# Patient Record
Sex: FEMALE | Race: BLACK OR AFRICAN AMERICAN
[De-identification: names, ages, dates, MRNs, and addresses within clinical notes are randomized per-mention and may not be internally consistent; named-entity substitution may affect disease eponyms.]

---

## 2019-08-31 ENCOUNTER — HOSPITAL ENCOUNTER (EMERGENCY)
Age: 24
Discharge: HOME | End: 2019-08-31
Payer: OTHER GOVERNMENT

## 2019-08-31 DIAGNOSIS — S61.211A: Primary | ICD-10-CM
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
## 2019-08-31 DIAGNOSIS — J45.909: ICD-10-CM
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
## 2019-08-31 PROCEDURE — 90715 TDAP VACCINE 7 YRS/> IM: CPT

## 2019-08-31 PROCEDURE — 12001 RPR S/N/AX/GEN/TRNK 2.5CM/<: CPT

## 2019-08-31 PROCEDURE — 99283 EMERGENCY DEPT VISIT LOW MDM: CPT

## 2019-08-31 PROCEDURE — 90471 IMMUNIZATION ADMIN: CPT

## 2020-07-17 ENCOUNTER — OFFICE (OUTPATIENT)
Dept: URBAN - METROPOLITAN AREA CLINIC 67 | Facility: CLINIC | Age: 25
End: 2020-07-17

## 2020-07-17 VITALS — SYSTOLIC BLOOD PRESSURE: 115 MMHG | DIASTOLIC BLOOD PRESSURE: 69 MMHG | WEIGHT: 124 LBS | HEIGHT: 65 IN

## 2020-07-17 DIAGNOSIS — K50.114 CROHN'S DISEASE OF LARGE INTESTINE W/ ABSCESS: ICD-10-CM

## 2020-07-17 PROCEDURE — 99203 OFFICE O/P NEW LOW 30 MIN: CPT | Performed by: INTERNAL MEDICINE

## 2020-07-17 RX ORDER — PREDNISONE 10 MG/1
TABLET ORAL
Qty: 100 | Status: ACTIVE
Start: 2020-07-17

## 2020-07-17 NOTE — SERVICEHPINOTES
This is a   24   year old  female   seen   in consultation at the request of Dr. JYOTI DENNIS  . Dianosed with UC for yers with last colo 2018 showing severe colitis in [place]. History of steroid tx, Remicade and Humira but admits that she stops the meds b/c she "hates taking meds". 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 She doesn't believe in meds and treats her colitis with diet.

## 2020-07-20 ENCOUNTER — AMBULATORY SURGICAL CENTER (OUTPATIENT)
Dept: URBAN - METROPOLITAN AREA SURGERY 42 | Facility: SURGERY | Age: 25
End: 2020-07-20

## 2020-07-20 VITALS
HEART RATE: 77 BPM | OXYGEN SATURATION: 99 % | DIASTOLIC BLOOD PRESSURE: 59 MMHG | TEMPERATURE: 98.4 F | SYSTOLIC BLOOD PRESSURE: 108 MMHG | HEIGHT: 65 IN | WEIGHT: 124 LBS | RESPIRATION RATE: 12 BRPM

## 2020-07-20 DIAGNOSIS — K50.114 CROHN'S DISEASE OF LARGE INTESTINE WITH ABSCESS: ICD-10-CM

## 2020-07-20 DIAGNOSIS — K63.89 OTHER SPECIFIED DISEASES OF INTESTINE: ICD-10-CM

## 2020-07-20 PROCEDURE — 45380 COLONOSCOPY AND BIOPSY: CPT | Performed by: INTERNAL MEDICINE

## 2020-09-23 ENCOUNTER — OFFICE (OUTPATIENT)
Dept: URBAN - METROPOLITAN AREA CLINIC 67 | Facility: CLINIC | Age: 25
End: 2020-09-23

## 2020-09-23 VITALS — HEIGHT: 65 IN

## 2020-09-23 DIAGNOSIS — K50.114 CROHN'S DISEASE OF LARGE INTESTINE W/ ABSCESS: ICD-10-CM

## 2020-09-23 PROCEDURE — 99213 OFFICE O/P EST LOW 20 MIN: CPT | Performed by: INTERNAL MEDICINE

## 2020-09-23 PROCEDURE — G0406 INPT/TELE FOLLOW UP 15: HCPCS | Performed by: INTERNAL MEDICINE

## 2020-09-23 NOTE — SERVICEHPINOTES
The patient complains of diarrhea.    Diarrheal symptoms started    ago.   Bowel movements have been occurring   1 - 3   times per day.    At the onset, diarrhea started   .   Stools have been occurring   at random times of the day  .   The stool is described as   soft   in consistency.   The patient    seen blood in the stool.   The patient has also noted   .   Possible exposures/etiologies include   .    Suspected exacerbating factors include   .   Diarrhea is alleviated so far by   .   The patient has already tried taking   .   Has noted    relief.   Has had    performed thus far for evaluation.   A prior colonoscopy was performed    years ago.

## 2021-06-10 ENCOUNTER — APPOINTMENT (OUTPATIENT)
Dept: FAMILY MEDICINE | Facility: CLINIC | Age: 26
End: 2021-06-10
Payer: COMMERCIAL

## 2021-06-10 DIAGNOSIS — K62.5 HEMORRHAGE OF ANUS AND RECTUM: ICD-10-CM

## 2021-06-10 PROCEDURE — 99423 OL DIG E/M SVC 21+ MIN: CPT

## 2021-06-10 NOTE — ASSESSMENT
[FreeTextEntry1] : I have along discussion with the patient trying to convince her that need to be evaluated ASAP to local ER to have blood work to asses the severity of anemia\par [place] in [place] and at that time was recommended to see Dr Yoder who saw her several years ago and will refer her to see a colo-rectal surgeon to address perianal fistula\par Reinforced the need of immediate ER Evaluation\par

## 2021-06-10 NOTE — HISTORY OF PRESENT ILLNESS
[FreeTextEntry1] : Rectal bleeding\par Known with Chron's disease\par currently in [place], [place] [de-identified] : Televisit\par Reports significant bleeding\par Does not take medication, reluctant to take medication\par Has also perianal fistula

## 2021-07-19 ENCOUNTER — APPOINTMENT (OUTPATIENT)
Dept: FAMILY MEDICINE | Facility: CLINIC | Age: 26
End: 2021-07-19
Payer: COMMERCIAL

## 2021-07-19 VITALS
HEIGHT: 65 IN | RESPIRATION RATE: 16 BRPM | BODY MASS INDEX: 20.66 KG/M2 | SYSTOLIC BLOOD PRESSURE: 90 MMHG | OXYGEN SATURATION: 97 % | WEIGHT: 124 LBS | HEART RATE: 67 BPM | DIASTOLIC BLOOD PRESSURE: 70 MMHG

## 2021-07-19 DIAGNOSIS — Z78.9 OTHER SPECIFIED HEALTH STATUS: ICD-10-CM

## 2021-07-19 DIAGNOSIS — Z87.19 PERSONAL HISTORY OF OTHER DISEASES OF THE DIGESTIVE SYSTEM: ICD-10-CM

## 2021-07-19 DIAGNOSIS — E55.9 VITAMIN D DEFICIENCY, UNSPECIFIED: ICD-10-CM

## 2021-07-19 DIAGNOSIS — Z00.00 ENCOUNTER FOR GENERAL ADULT MEDICAL EXAMINATION W/OUT ABNORMAL FINDINGS: ICD-10-CM

## 2021-07-19 DIAGNOSIS — B35.4 TINEA CORPORIS: ICD-10-CM

## 2021-07-19 DIAGNOSIS — D50.0 IRON DEFICIENCY ANEMIA SECONDARY TO BLOOD LOSS (CHRONIC): ICD-10-CM

## 2021-07-19 DIAGNOSIS — K63.2 FISTULA OF INTESTINE: ICD-10-CM

## 2021-07-19 DIAGNOSIS — K50.90 CROHN'S DISEASE, UNSPECIFIED, W/OUT COMPLICATIONS: ICD-10-CM

## 2021-07-19 PROCEDURE — G0442 ANNUAL ALCOHOL SCREEN 15 MIN: CPT

## 2021-07-19 PROCEDURE — 99072 ADDL SUPL MATRL&STAF TM PHE: CPT

## 2021-07-19 PROCEDURE — 99395 PREV VISIT EST AGE 18-39: CPT | Mod: 25

## 2021-07-19 PROCEDURE — 36415 COLL VENOUS BLD VENIPUNCTURE: CPT

## 2021-07-19 PROCEDURE — G0444 DEPRESSION SCREEN ANNUAL: CPT | Mod: 59

## 2021-07-19 RX ORDER — CLOTRIMAZOLE AND BETAMETHASONE DIPROPIONATE 10; .5 MG/G; MG/G
1-0.05 CREAM TOPICAL TWICE DAILY
Qty: 1 | Refills: 3 | Status: ACTIVE | COMMUNITY
Start: 2021-07-19 | End: 1900-01-01

## 2021-07-19 NOTE — HEALTH RISK ASSESSMENT
[Good] : ~his/her~  mood as  good [No] : No [No falls in past year] : Patient reported no falls in the past year [0] : 2) Feeling down, depressed, or hopeless: Not at all (0) [Audit-CScore] : 0 [[id number]] : 0

## 2021-07-19 NOTE — HEALTH RISK ASSESSMENT
[Good] : ~his/her~  mood as  good [No] : No [No falls in past year] : Patient reported no falls in the past year [0] : 2) Feeling down, depressed, or hopeless: Not at all (0) [Audit-CScore] : 0 [[id number]] : 0

## 2021-07-19 NOTE — PLAN
[FreeTextEntry1] : Recommended close GI f/u\par Will contact the patient with results\par Consider IV iron

## 2021-07-19 NOTE — HEALTH RISK ASSESSMENT
[Good] : ~his/her~  mood as  good [No] : No [No falls in past year] : Patient reported no falls in the past year [0] : 2) Feeling down, depressed, or hopeless: Not at all (0) [Audit-CScore] : 0 [[id number]] : 0

## 2021-07-20 LAB
25(OH)D3 SERPL-MCNC: 31.6 NG/ML
ALBUMIN SERPL ELPH-MCNC: 4.6 G/DL
ALP BLD-CCNC: 71 U/L
ALT SERPL-CCNC: 13 U/L
ANION GAP SERPL CALC-SCNC: 11 MMOL/L
AST SERPL-CCNC: 21 U/L
BASOPHILS # BLD AUTO: 0.02 K/UL
BASOPHILS NFR BLD AUTO: 0.4 %
BILIRUB SERPL-MCNC: 0.3 MG/DL
BUN SERPL-MCNC: 7 MG/DL
CALCIUM SERPL-MCNC: 9.4 MG/DL
CHLORIDE SERPL-SCNC: 102 MMOL/L
CHOLEST SERPL-MCNC: 150 MG/DL
CO2 SERPL-SCNC: 26 MMOL/L
CREAT SERPL-MCNC: 0.68 MG/DL
CRP SERPL-MCNC: <3 MG/L
EOSINOPHIL # BLD AUTO: 0.07 K/UL
EOSINOPHIL NFR BLD AUTO: 1.3 %
ERYTHROCYTE [SEDIMENTATION RATE] IN BLOOD BY WESTERGREN METHOD: 38 MM/HR
FERRITIN SERPL-MCNC: 9 NG/ML
GLUCOSE SERPL-MCNC: 74 MG/DL
HCT VFR BLD CALC: 40.3 %
HDLC SERPL-MCNC: 46 MG/DL
HGB BLD-MCNC: 12.6 G/DL
IMM GRANULOCYTES NFR BLD AUTO: 0.2 %
IRON SATN MFR SERPL: 12 %
IRON SERPL-MCNC: 54 UG/DL
LDLC SERPL CALC-MCNC: 81 MG/DL
LYMPHOCYTES # BLD AUTO: 1.81 K/UL
LYMPHOCYTES NFR BLD AUTO: 32.6 %
MAN DIFF?: NORMAL
MCHC RBC-ENTMCNC: 29.4 PG
MCHC RBC-ENTMCNC: 31.3 GM/DL
MCV RBC AUTO: 94.2 FL
MONOCYTES # BLD AUTO: 0.62 K/UL
MONOCYTES NFR BLD AUTO: 11.2 %
NEUTROPHILS # BLD AUTO: 3.02 K/UL
NEUTROPHILS NFR BLD AUTO: 54.3 %
NONHDLC SERPL-MCNC: 104 MG/DL
PLATELET # BLD AUTO: 273 K/UL
POTASSIUM SERPL-SCNC: 3.9 MMOL/L
PROT SERPL-MCNC: 7.2 G/DL
RBC # BLD: 4.28 M/UL
RBC # FLD: 14 %
SODIUM SERPL-SCNC: 140 MMOL/L
TIBC SERPL-MCNC: 461 UG/DL
TRIGL SERPL-MCNC: 110 MG/DL
TSH SERPL-ACNC: 1.21 UIU/ML
UIBC SERPL-MCNC: 407 UG/DL
VIT B12 SERPL-MCNC: 959 PG/ML
WBC # FLD AUTO: 5.55 K/UL

## 2021-09-27 NOTE — PLAN
[FreeTextEntry1] : Recommended close GI f/u\par Will contact the patient with results\par Consider IV iron Xerosis Aggressive Treatment: I recommended application of Cetaphil or CeraVe numerous times a day and before going to bed to all dry areas. I also prescribed a topical steroid for twice daily use.

## 2022-05-03 VITALS — HEIGHT: 65 IN | WEIGHT: 123 LBS

## 2022-05-04 ENCOUNTER — OFFICE (OUTPATIENT)
Dept: URBAN - METROPOLITAN AREA CLINIC 67 | Facility: CLINIC | Age: 27
End: 2022-05-04

## 2022-05-04 DIAGNOSIS — K50.114 CROHN'S DISEASE OF LARGE INTESTINE W/ ABSCESS: ICD-10-CM

## 2022-05-04 PROCEDURE — G0406 INPT/TELE FOLLOW UP 15: HCPCS | Performed by: NURSE PRACTITIONER

## 2022-05-04 PROCEDURE — 99214 OFFICE O/P EST MOD 30 MIN: CPT | Performed by: NURSE PRACTITIONER

## 2022-05-04 NOTE — SERVICEHPINOTES
CHARLI FORTUNE   returns today for follow-up from last visit on   9/23/2020  .   Diagnosed w/ Crohn's with chikis-anal disease by Dr Cardona in 2020 . at that time, discussed options and possible medication treatment but did not not want to transition to immune modulators. Gave her multiple med options especially Remicade or Humira but she wanted to defer and  to call us with a decision. Lost to f/u as moved to [place] where after a flare and hospitalization she was started on remicade and was doing well on q8 week home infusions [place]. While in [place] she also had a fistulectomy 2/13/2022 wo [facility] placement as was no need, re-evaluation after 6 weeks showed healing. Denies rectal bleeding abd pain, in general no diarrhea unless dietary indiscretion such as ingesting cheese. She endorses latest blood work done 1 month ago while in [place]. She will attempt to send us all her outside records. No subsequent colonoscopy after Remicade commencement/fistulectomy as it was felt she was doing well- this according to pt. Endorses having been diagnosed w/ UC 6 yrs prior to changing diagnosis to Crohn's in 2020br
br She present today to transition her Remicade infusion to locally w/ same company as the one in [place]. Currently asymptomatic.

## 2022-09-27 ENCOUNTER — OFFICE (OUTPATIENT)
Dept: URBAN - METROPOLITAN AREA CLINIC 67 | Facility: CLINIC | Age: 27
End: 2022-09-27

## 2022-09-27 VITALS
WEIGHT: 128 LBS | DIASTOLIC BLOOD PRESSURE: 70 MMHG | TEMPERATURE: 97.5 F | HEIGHT: 65 IN | SYSTOLIC BLOOD PRESSURE: 118 MMHG

## 2022-09-27 DIAGNOSIS — K50.114 CROHN'S DISEASE OF LARGE INTESTINE W/ ABSCESS: ICD-10-CM

## 2022-09-27 PROCEDURE — 99215 OFFICE O/P EST HI 40 MIN: CPT | Performed by: INTERNAL MEDICINE

## 2022-09-27 NOTE — SERVICEHPINOTES
CHARLI FORTUNE   returns today for follow-up from last visit on   5/4/2022  .   recent [facility] hospitalization- had colonoscopy showing little inflammation- thy felt the Remicade working. Her c/o is that she feels constipated

## 2023-01-17 ENCOUNTER — OFFICE (OUTPATIENT)
Dept: URBAN - METROPOLITAN AREA CLINIC 67 | Facility: CLINIC | Age: 28
End: 2023-01-17

## 2023-01-17 VITALS — HEIGHT: 65 IN | WEIGHT: 128 LBS

## 2023-01-17 DIAGNOSIS — K63.89: ICD-10-CM

## 2023-01-17 DIAGNOSIS — J06.9: ICD-10-CM

## 2023-01-17 DIAGNOSIS — K50.114 CROHN'S DISEASE OF LARGE INTESTINE W/ ABSCESS: ICD-10-CM

## 2023-01-17 PROCEDURE — 99214 OFFICE O/P EST MOD 30 MIN: CPT | Performed by: INTERNAL MEDICINE

## 2023-01-17 PROCEDURE — G0406 INPT/TELE FOLLOW UP 15: HCPCS | Performed by: INTERNAL MEDICINE

## 2023-01-17 RX ORDER — OMEPRAZOLE 20 MG/1
20 CAPSULE, DELAYED RELEASE ORAL
Qty: 30 | Status: COMPLETED
Start: 2023-01-17 | End: 2023-10-23

## 2023-01-17 NOTE — SERVICEHPINOTES
CHARLI FORTUNE   returns today for follow-up from last visit on   9/27/2022  .    ingested geoff juice last Tuesday and vomited up and severe sore throat since. Also has viral URI with sore throat.

## 2023-10-23 ENCOUNTER — OFFICE (OUTPATIENT)
Dept: URBAN - METROPOLITAN AREA CLINIC 67 | Facility: CLINIC | Age: 28
End: 2023-10-23

## 2023-10-23 VITALS — HEIGHT: 65 IN | DIASTOLIC BLOOD PRESSURE: 60 MMHG | WEIGHT: 133 LBS | SYSTOLIC BLOOD PRESSURE: 110 MMHG

## 2023-10-23 DIAGNOSIS — K50.114 CROHN'S DISEASE OF LARGE INTESTINE W/ ABSCESS: ICD-10-CM

## 2023-10-23 PROCEDURE — 99214 OFFICE O/P EST MOD 30 MIN: CPT | Performed by: INTERNAL MEDICINE

## 2023-10-30 ENCOUNTER — Encounter (OUTPATIENT)
Dept: URBAN - METROPOLITAN AREA CLINIC 68 | Facility: CLINIC | Age: 28
End: 2023-10-30

## 2023-10-30 VITALS — HEIGHT: 65 IN

## 2024-03-05 ENCOUNTER — OFFICE (OUTPATIENT)
Dept: URBAN - METROPOLITAN AREA CLINIC 67 | Facility: CLINIC | Age: 29
End: 2024-03-05

## 2024-03-05 VITALS — HEIGHT: 65 IN

## 2024-03-05 DIAGNOSIS — K50.114 CROHN'S DISEASE OF LARGE INTESTINE W/ ABSCESS: ICD-10-CM

## 2024-03-05 DIAGNOSIS — R14.0 ABDOMINAL BLOATING: ICD-10-CM

## 2024-03-05 PROCEDURE — 99214 OFFICE O/P EST MOD 30 MIN: CPT | Performed by: INTERNAL MEDICINE

## 2024-03-05 PROCEDURE — G0406 INPT/TELE FOLLOW UP 15: HCPCS | Performed by: INTERNAL MEDICINE

## 2024-03-05 NOTE — SERVICEHPINOTES
CHARLI FORTUNE   returns today for follow-up from last visit on   10/30/2023  .    She is stable on Remicade q 8 weeks and c/o constipation.

## 2024-09-05 ENCOUNTER — OFFICE (OUTPATIENT)
Dept: URBAN - METROPOLITAN AREA CLINIC 67 | Facility: CLINIC | Age: 29
End: 2024-09-05

## 2024-09-05 VITALS — HEIGHT: 65 IN

## 2024-09-05 DIAGNOSIS — K50.114 CROHN'S DISEASE OF LARGE INTESTINE W/ ABSCESS: ICD-10-CM

## 2024-09-05 PROCEDURE — 99214 OFFICE O/P EST MOD 30 MIN: CPT | Performed by: INTERNAL MEDICINE

## 2024-09-05 PROCEDURE — G0406 INPT/TELE FOLLOW UP 15: HCPCS | Performed by: INTERNAL MEDICINE

## 2024-09-05 NOTE — SERVICEHPINOTES
CHARLI FORTUNE   returns today for follow-up from last visit on   3/5/2024  .    Feeling like she has a virus for 6 weeks now. Last Remicade 7 wks ago.